# Patient Record
Sex: FEMALE | Employment: UNEMPLOYED | URBAN - NONMETROPOLITAN AREA
[De-identification: names, ages, dates, MRNs, and addresses within clinical notes are randomized per-mention and may not be internally consistent; named-entity substitution may affect disease eponyms.]

---

## 2021-11-16 ENCOUNTER — NON-PROVIDER VISIT (OUTPATIENT)
Dept: URGENT CARE | Facility: PHYSICIAN GROUP | Age: 41
End: 2021-11-16

## 2021-11-16 DIAGNOSIS — Z02.1 PRE-EMPLOYMENT DRUG SCREENING: ICD-10-CM

## 2021-11-16 PROCEDURE — 8907 PR URINE COLLECT ONLY: Performed by: PHYSICIAN ASSISTANT

## 2022-12-29 ENCOUNTER — OFFICE VISIT (OUTPATIENT)
Dept: URGENT CARE | Facility: PHYSICIAN GROUP | Age: 42
End: 2022-12-29
Payer: COMMERCIAL

## 2022-12-29 VITALS
TEMPERATURE: 97.5 F | DIASTOLIC BLOOD PRESSURE: 82 MMHG | OXYGEN SATURATION: 97 % | HEART RATE: 90 BPM | SYSTOLIC BLOOD PRESSURE: 122 MMHG | BODY MASS INDEX: 39.21 KG/M2 | RESPIRATION RATE: 14 BRPM | WEIGHT: 244 LBS | HEIGHT: 66 IN

## 2022-12-29 DIAGNOSIS — R05.1 ACUTE COUGH: ICD-10-CM

## 2022-12-29 DIAGNOSIS — R07.9 CHEST PAIN, UNSPECIFIED TYPE: ICD-10-CM

## 2022-12-29 PROCEDURE — 99204 OFFICE O/P NEW MOD 45 MIN: CPT | Performed by: PHYSICIAN ASSISTANT

## 2022-12-30 NOTE — PROGRESS NOTES
"Subjective:   Di Goldman is a 42 y.o. female who presents for Cough (X 1day) and Side Pain (Radiates behind the breast )      HPI  The patient presents to the Urgent Care with complaints of chest pain onset yesterday.  She states her spouse has had a cough for the past several days.  Today, she felt a hot flash and a mild sore throat today.  Yesterday, she has noticed intermittent chest pain located behind her left breast.  Unable to palpate the pain.  It is a mild pain right now.  No known aggravating or alleviating factors.  She does have a mild cough. Denies any recorded fever, shortness of breath, hemoptysis, vomiting, diarrhea.  Denies any dizziness.  Mother has a history of breast cancer.  Her last mammogram was 2 years ago.  She does not take HRT.      Medications:    This patient does not have an active medication from one of the medication groupers.    Allergies: Patient has no allergy information on record.    Problem List: Di Goldman does not have a problem list on file.    Surgical History:  No past surgical history on file.    Past Social Hx: Di Goldman  reports that she has never smoked. She has never used smokeless tobacco. She reports that she does not currently use alcohol. She reports that she does not use drugs.     Past Family Hx:  Di Goldman family history is not on file.     Problem list, medications, and allergies reviewed by myself today in Epic.     Objective:     /82   Pulse 90   Temp 36.4 °C (97.5 °F) (Temporal)   Resp 14   Ht 1.676 m (5' 6\")   Wt 111 kg (244 lb)   SpO2 97%   BMI 39.38 kg/m²     Physical Exam  Vitals reviewed.   Constitutional:       General: She is not in acute distress.     Appearance: Normal appearance. She is not ill-appearing or toxic-appearing.   HENT:      Mouth/Throat:      Mouth: Mucous membranes are moist.      Pharynx: Oropharynx is clear.   Eyes:      Conjunctiva/sclera: Conjunctivae normal.      Pupils: " Pupils are equal, round, and reactive to light.   Cardiovascular:      Rate and Rhythm: Normal rate and regular rhythm.      Heart sounds: Normal heart sounds.   Pulmonary:      Effort: Pulmonary effort is normal. No respiratory distress.      Breath sounds: Normal breath sounds. No wheezing, rhonchi or rales.   Musculoskeletal:      Cervical back: Neck supple. No rigidity.   Skin:     General: Skin is warm and dry.   Neurological:      General: No focal deficit present.      Mental Status: She is alert and oriented to person, place, and time.   Psychiatric:         Mood and Affect: Mood normal.         Behavior: Behavior normal.       Diagnosis and associated orders:     1. Chest pain, unspecified type    2. Acute cough       Comments/MDM:     This is a pleasant 42-year-old female who presents to the urgent care with complaints of left-sided chest pain onset yesterday.  Located underneath her breast.  See full history above.  Exam findings are unrevealing.  Unclear etiology.  Discussed with patient my differential diagnosis.  Discussed chest pain can be concerning.  Therefore, it was highly recommended she present across the street to the ER for higher level of evaluation and care.  Patient verbalized understanding and agreed to plan.  She is going to present immediately to the ER.  Patient otherwise is stable.  Normal vital signs.  She had no other questions or concerns.  She appreciated the visit.       Please note that this dictation was created using voice recognition software. I have made a reasonable attempt to correct obvious errors, but I expect that there are errors of grammar and possibly content that I did not discover before finalizing the note.    This note was electronically signed by Xavi Ivy PA-C